# Patient Record
Sex: MALE | Race: BLACK OR AFRICAN AMERICAN | NOT HISPANIC OR LATINO | Employment: FULL TIME | URBAN - METROPOLITAN AREA
[De-identification: names, ages, dates, MRNs, and addresses within clinical notes are randomized per-mention and may not be internally consistent; named-entity substitution may affect disease eponyms.]

---

## 2017-01-19 ENCOUNTER — OFFICE VISIT (OUTPATIENT)
Dept: CARDIOLOGY | Facility: CLINIC | Age: 22
End: 2017-01-19
Attending: INTERNAL MEDICINE
Payer: COMMERCIAL

## 2017-01-19 VITALS
DIASTOLIC BLOOD PRESSURE: 104 MMHG | WEIGHT: 315 LBS | BODY MASS INDEX: 44.1 KG/M2 | HEIGHT: 71 IN | HEART RATE: 88 BPM | SYSTOLIC BLOOD PRESSURE: 180 MMHG

## 2017-01-19 DIAGNOSIS — I42.9 SECONDARY CARDIOMYOPATHY (H): ICD-10-CM

## 2017-01-19 DIAGNOSIS — I10 ESSENTIAL HYPERTENSION WITH GOAL BLOOD PRESSURE LESS THAN 140/90: ICD-10-CM

## 2017-01-19 DIAGNOSIS — I10 BENIGN ESSENTIAL HYPERTENSION: Primary | ICD-10-CM

## 2017-01-19 DIAGNOSIS — I10 BENIGN ESSENTIAL HYPERTENSION: ICD-10-CM

## 2017-01-19 LAB
ANION GAP SERPL CALCULATED.3IONS-SCNC: 15.1 MMOL/L (ref 6–17)
BUN SERPL-MCNC: 12 MG/DL (ref 7–30)
CALCIUM SERPL-MCNC: 9.4 MG/DL (ref 8.5–10.5)
CHLORIDE SERPL-SCNC: 102 MMOL/L (ref 98–107)
CO2 SERPL-SCNC: 26 MMOL/L (ref 23–29)
CREAT SERPL-MCNC: 0.88 MG/DL (ref 0.7–1.3)
GFR SERPL CREATININE-BSD FRML MDRD: >90 ML/MIN/1.7M2
GLUCOSE SERPL-MCNC: 123 MG/DL (ref 70–105)
POTASSIUM SERPL-SCNC: 3.1 MMOL/L (ref 3.5–5.1)
SODIUM SERPL-SCNC: 140 MMOL/L (ref 136–145)

## 2017-01-19 PROCEDURE — 36415 COLL VENOUS BLD VENIPUNCTURE: CPT | Performed by: INTERNAL MEDICINE

## 2017-01-19 PROCEDURE — 80048 BASIC METABOLIC PNL TOTAL CA: CPT | Performed by: INTERNAL MEDICINE

## 2017-01-19 PROCEDURE — 99214 OFFICE O/P EST MOD 30 MIN: CPT | Performed by: INTERNAL MEDICINE

## 2017-01-19 RX ORDER — SPIRONOLACTONE 50 MG/1
50 TABLET, FILM COATED ORAL DAILY
Qty: 90 TABLET | Refills: 3 | Status: SHIPPED | OUTPATIENT
Start: 2017-01-19

## 2017-01-19 NOTE — Clinical Note
1/19/2017    Kendra Chiang, DO  [facility]  [address]  [place] [address]    RE: Kirk Chiu       Dear Colleague,    I had the pleasure of seeing Kirk in Cardiology Clinic today.  He is a 21-year-old male with previous history of myocarditis, nonischemic cardiomyopathy related to uncontrolled hypertension who returns for followup.  He also has sleep apnea and morbid obesity.  He takes CPAP religiously.  Unfortunately, his blood pressure remains uncontrolled because of noncompliance.  He forgets medications more often than not.  He usually takes all his medications about 3 days a week.  His concern is that he is always moving around, and he forgets taking it in the morning and then the pills are not with him.  We talked at length about importance of compliance with medications.  I discussed with him various ways he could be improving that.  He could keep a setup of pills at work or in his car.  We also talked about moving his pills into his bathroom so he can remember taking them.  I also reviewed with him some apps on his iPhone that he can use to remind him to take the pills.  There are some pill tracker apps that he could use.  He promises me that he will be much more compliant and follow that.  His BMP today shows a low potassium of 3.1, but he has not taken his spironolactone adequately.  In any case, I believe that he would benefit from a higher dose of spironolactone, and therefore I will increase it to 50 mg daily.  I also asked him to take 2 bananas today and tomorrow and then will check a BMP in 1-2 weeks.  He is also on lisinopril 40 per day, amlodipine 10 per day and Coreg 25 b.i.d., which will be continued.  He does want to join a fitness club, but I have asked him to wait until his blood pressure is well controlled.  Losing weight will help a lot, and he is dedicated to do that this year.  His last echocardiogram in July showed an EF of 40%-45%, which I  believe in part is related to his uncontrolled hypertension.  He had a history of myocarditis in 2014.        PHYSICAL EXAMINATION:    VITAL SIGNS:  Blood pressure initially was 199/130, on repeat check was 188/104 after he sat down for 15 minutes and then we used a larger cuff.  He has not taken his pills this morning and is going to take it immediately as he gets home from this visit.  Pulse is 88 per minute and regular.   CARDIAC:  Regular S1, S2 with an S4, no murmurs were heard.   CHEST:  Clear to auscultation.      Outpatient Encounter Prescriptions as of 1/19/2017   Medication Sig Dispense Refill     spironolactone (ALDACTONE) 50 MG tablet Take 1 tablet (50 mg) by mouth daily 90 tablet 3     albuterol (PROAIR HFA, PROVENTIL HFA, VENTOLIN HFA) 108 (90 BASE) MCG/ACT inhaler Inhale 2 puffs into the lungs every 6 hours       carvedilol (COREG) 25 MG tablet Take 1 tablet (25 mg) by mouth 2 times daily (with meals) 180 tablet 3     amLODIPine (NORVASC) 10 MG tablet Take 1 tablet (10 mg) by mouth daily 90 tablet 3     lisinopril (PRINIVIL,ZESTRIL) 40 MG tablet Take 1 tablet (40 mg) by mouth daily Needs office visit please 90 tablet 3     ORDER FOR DME Wears CPAP every night       Multiple Vitamins-Minerals (MULTIVITAMIN & MINERAL PO) Take 1 tablet by mouth daily       Cholecalciferol (VITAMIN D) 1000 UNITS capsule Take 1 capsule by mouth daily       [DISCONTINUED] spironolactone (ALDACTONE) 25 MG tablet Take 1 tablet (25 mg) by mouth daily 90 tablet 3     No facility-administered encounter medications on file as of 1/19/2017.     IMPRESSION:   1.  Hypertension.  Key here is to modify his lifestyle and be more compliant with medications.  Several studies were discussed as noted above.  I have also increased spironolactone to 50 per day and will repeat a BMP in 2 weeks and have him see my nurse practitioner in 2 weeks.  I told him that he needs several visits with my nurse practitioner to get his blood pressure under  better control.  He means well but has difficulty remembering to take pills.  I am hoping he will improve that.   2.  History of myocarditis.  His last EF was 40%-45%.  I am hoping with improvement in ejection fraction, his EF will improve further.      PLAN:   1.  BMP in 2 weeks and followup with Pao.   2.  Increase spironolactone to 50 per day.   3.  Eat bananas today and tomorrow for replacing the potassium.   4.  I would recommend a followup echocardiogram with me in 6 months and visit at that time.     Again, thank you for allowing me to participate in the care of your patient.      Sincerely,    Luis Whiteside MD     [facility]

## 2017-01-19 NOTE — PATIENT INSTRUCTIONS
Two bananas today  Take 50mg spironolactone from now on  Keep pills in accessible place   Get back you in 2 weeks

## 2017-01-19 NOTE — PROGRESS NOTES
HPI and Plan:   See dictation  [id number]    Orders Placed This Encounter   Procedures     Basic metabolic panel     Follow-Up with Cardiac Advanced Practice Provider       Orders Placed This Encounter   Medications     spironolactone (ALDACTONE) 50 MG tablet     Sig: Take 1 tablet (50 mg) by mouth daily     Dispense:  90 tablet     Refill:  3       Medications Discontinued During This Encounter   Medication Reason     spironolactone (ALDACTONE) 25 MG tablet Reorder         Encounter Diagnoses   Name Primary?     Secondary cardiomyopathy (H)      Benign essential hypertension Yes     Essential hypertension with goal blood pressure less than 140/90        CURRENT MEDICATIONS:  Current Outpatient Prescriptions   Medication Sig Dispense Refill     spironolactone (ALDACTONE) 50 MG tablet Take 1 tablet (50 mg) by mouth daily 90 tablet 3     albuterol (PROAIR HFA, PROVENTIL HFA, VENTOLIN HFA) 108 (90 BASE) MCG/ACT inhaler Inhale 2 puffs into the lungs every 6 hours       carvedilol (COREG) 25 MG tablet Take 1 tablet (25 mg) by mouth 2 times daily (with meals) 180 tablet 3     amLODIPine (NORVASC) 10 MG tablet Take 1 tablet (10 mg) by mouth daily 90 tablet 3     lisinopril (PRINIVIL,ZESTRIL) 40 MG tablet Take 1 tablet (40 mg) by mouth daily Needs office visit please 90 tablet 3     ORDER FOR DME Wears CPAP every night       Multiple Vitamins-Minerals (MULTIVITAMIN & MINERAL PO) Take 1 tablet by mouth daily       Cholecalciferol (VITAMIN D) 1000 UNITS capsule Take 1 capsule by mouth daily       [DISCONTINUED] spironolactone (ALDACTONE) 25 MG tablet Take 1 tablet (25 mg) by mouth daily 90 tablet 3       ALLERGIES     Allergies   Allergen Reactions     Nkda [No Known Drug Allergies]        PAST MEDICAL HISTORY:  Past Medical History   Diagnosis Date     Morbid obesity (H)      Asthma with acute exacerbation 10/12/14     asthma 2006 diagnosis     HTN, goal below 140/90      Pulmonary nodule      Cardiomyopathy (H) Oct 2014      "possible viral myocarditis, 10/2014 EF 35% per Echo, 10/2014 EF 43% per Cardiac MRI     ADELA (obstructive sleep apnea) OCT 2014       PAST SURGICAL HISTORY:  History reviewed. No pertinent past surgical history.    FAMILY HISTORY:  Family History   Problem Relation Age of Onset     Allergies Maternal Grandmother      Breast Cancer Maternal Grandmother      CANCER Maternal Grandmother      4 sibs with various cancers     Allergies Mother      HEART DISEASE Mother      cardiomyopathy     Unknown/Adopted Father      Family History Negative Maternal Grandfather      Unknown/Adopted Maternal Grandfather      Unknown/Adopted Paternal Grandmother      Unknown/Adopted Paternal Grandfather        SOCIAL HISTORY:  Social History     Social History     Marital Status: Single     Spouse Name: N/A     Number of Children: N/A     Years of Education: N/A     Social History Main Topics     Smoking status: Never Smoker      Smokeless tobacco: Never Used     Alcohol Use: No     Drug Use: No     Sexual Activity: No     Other Topics Concern     Parent/Sibling W/ Cabg, Mi Or Angioplasty Before 65f 55m? No     Caffeine Concern No     none     Sleep Concern Yes     sleep apnea, wears c-pap     Stress Concern No     Weight Concern Yes     weight decrease 4 lbs     Special Diet No     Hasn't been following a diet     Back Care No     Exercise No     Basketball x2 a week     Seat Belt Yes     Social History Narrative       Review of Systems:  Skin:  Negative       Eyes:  Positive for glasses    ENT:  Negative      Respiratory:  Positive for sleep apnea;CPAP asthma   Cardiovascular:  Negative      Gastroenterology: Negative      Genitourinary:  Negative      Musculoskeletal:  Negative      Neurologic:  Negative      Psychiatric:  Negative      Heme/Lymph/Imm:  Negative      Endocrine:  Negative        Physical Exam:  Vitals: /104 mmHg  Pulse 88  Ht 1.803 m (5' 11\")  Wt 191.418 kg (422 lb)  BMI 58.88 kg/m2    Constitutional:  alert " and oriented morbidly obese      Skin:  warm and dry to the touch        Head:  normocephalic        Eyes:  pupils equal and round        ENT:  no pallor or cyanosis        Neck:  carotid pulses are full and equal bilaterally        Chest:    prolonged expiration        Cardiac: regular rhythm;normal S1 and S2   S4;distant heart sounds              Abdomen:  abdomen soft obese      Vascular: not assessed this visit                                        Extremities and Back:  no edema              Neurological:  no gross motor deficits              CC  Kendra Chiang, [facility]  [address] [facility] [address]  [place] [address]

## 2017-01-19 NOTE — MR AVS SNAPSHOT
After Visit Summary   1/19/2017    Kirk Chiu    MRN: [id number]           Patient Information     Date Of Birth          1995        Visit Information        Provider Department      1/19/2017 10:45 AM Luis Whiteside MD [facility] [place]        Today's Diagnoses     Benign essential hypertension    -  1     Secondary cardiomyopathy (H)         Essential hypertension with goal blood pressure less than 140/90           Care Instructions    Two bananas today  Take 50mg spironolactone from now on  Keep pills in accessible place   Get back you in 2 weeks            Follow-ups after your visit        Additional Services     Follow-Up with Cardiac Advanced Practice Provider                 Future tests that were ordered for you today     Open Future Orders        Priority Expected Expires Ordered    Basic metabolic panel Routine 2/2/2017 1/19/2018 1/19/2017    Follow-Up with Cardiac Advanced Practice Provider Routine 2/2/2017 1/19/2018 1/19/2017            Who to contact     If you have questions or need follow up information about today's clinic visit or your schedule please contact [facility] directly at [phone].  Normal or non-critical lab and imaging results will be communicated to you by [employer], letter or phone within 4 business days after the clinic has received the results. If you do not hear from us within 7 days, please contact the clinic through [employer] or phone. If you have a critical or abnormal lab result, we will notify you by phone as soon as possible.  Submit refill requests through [employer] or call your pharmacy and they will forward the refill request to us. Please allow 3 business days for your refill to be completed.          Additional Information About Your Visit        MyChart Information     [employer] gives you secure access to your electronic health record. If you see a primary  "care provider, you can also send messages to your care team and make appointments. If you have questions, please call your primary care clinic.  If you do not have a primary care provider, please call [phone] and they will assist you.        Care EveryWhere ID     This is your Care EveryWhere ID. This could be used by other organizations to access your [facility] medical records  [id number]        Your Vitals Were     Pulse Height BMI (Body Mass Index)             88 1.803 m (5' 11\") 58.88 kg/m2          Blood Pressure from Last 3 Encounters:   01/19/17 180/104   10/24/16 158/102   09/20/16 180/116    Weight from Last 3 Encounters:   01/19/17 191.418 kg (422 lb)   10/24/16 189.604 kg (418 lb)   09/20/16 186.247 kg (410 lb 9.6 oz)              We Performed the Following     Follow-Up with Cardiologist          Today's Medication Changes          These changes are accurate as of: 1/19/17 10:49 AM.  If you have any questions, ask your nurse or doctor.               These medicines have changed or have updated prescriptions.        Dose/Directions    spironolactone 50 MG tablet   Commonly known as:  ALDACTONE   This may have changed:    - medication strength  - how much to take   Used for:  Essential hypertension with goal blood pressure less than 140/90   Changed by:  Luis Whiteside MD        Dose:  50 mg   Take 1 tablet (50 mg) by mouth daily   Quantity:  90 tablet   Refills:  3            Where to get your medicines      These medications were sent to [id number] - [facility] [address] AT [facility] [address], [facility] [address]    Hours:  24-hours Phone:  [phone]    - spironolactone 50 MG tablet             Primary Care Provider Office Phone # Fax #    Kendra Chiang [phone]       [facility] [place] [address]  [place] [address]        Goals        Patient-Stated    \"I will take my blood pressure once a " "day and bring in the readings to my doctors.\"      Goal Comments - Note edited  11/11/2014 11:11 AM by Anny Burton RN    As of today's date 10/28/2014 goal is met at 0 - 25%.   Goal Status:  Ongoing  As of today's date 11/11/2014 goal is met at 76 - 100%.   Goal Status:  Ongoing        Thank you!     Thank you for choosing [facility] [place]  for your care. Our goal is always to provide you with excellent care. Hearing back from our patients is one way we can continue to improve our services. Please take a few minutes to complete the written survey that you may receive in the mail after your visit with us. Thank you!             Your Updated Medication List - Protect others around you: Learn how to safely use, store and throw away your medicines at www.disposemymeds.org.          This list is accurate as of: 1/19/17 10:49 AM.  Always use your most recent med list.                   Brand Name Dispense Instructions for use    albuterol 108 (90 BASE) MCG/ACT Inhaler    PROAIR HFA/PROVENTIL HFA/VENTOLIN HFA     Inhale 2 puffs into the lungs every 6 hours       amLODIPine 10 MG tablet    NORVASC    90 tablet    Take 1 tablet (10 mg) by mouth daily       carvedilol 25 MG tablet    COREG    180 tablet    Take 1 tablet (25 mg) by mouth 2 times daily (with meals)       lisinopril 40 MG tablet    PRINIVIL/ZESTRIL    90 tablet    Take 1 tablet (40 mg) by mouth daily Needs office visit please       MULTIVITAMIN & MINERAL PO      Take 1 tablet by mouth daily       order for DME      Wears CPAP every night       spironolactone 50 MG tablet    ALDACTONE    90 tablet    Take 1 tablet (50 mg) by mouth daily       vitamin D 1000 UNITS capsule      Take 1 capsule by mouth daily         "

## 2017-01-20 NOTE — PROGRESS NOTES
HISTORY OF PRESENT ILLNESS:  I had the pleasure of seeing Kirk in Cardiology Clinic today.  He is a 21-year-old male with previous history of myocarditis, nonischemic cardiomyopathy related to uncontrolled hypertension who returns for followup.  He also has sleep apnea and morbid obesity.  He takes CPAP religiously.  Unfortunately, his blood pressure remains uncontrolled because of noncompliance.  He forgets medications more often than not.  He usually takes all his medications about 3 days a week.  His concern is that he is always moving around, and he forgets taking it in the morning and then the pills are not with him.  We talked at length about importance of compliance with medications.  I discussed with him various ways he could be improving that.  He could keep a setup of pills at work or in his car.  We also talked about moving his pills into his bathroom so he can remember taking them.  I also reviewed with him some apps on his iPhone that he can use to remind him to take the pills.  There are some pill tracker apps that he could use.  He promises me that he will be much more compliant and follow that.  His BMP today shows a low potassium of 3.1, but he has not taken his spironolactone adequately.  In any case, I believe that he would benefit from a higher dose of spironolactone, and therefore I will increase it to 50 mg daily.  I also asked him to take 2 bananas today and tomorrow and then will check a BMP in 1-2 weeks.  He is also on lisinopril 40 per day, amlodipine 10 per day and Coreg 25 b.i.d., which will be continued.  He does want to join a fitness club, but I have asked him to wait until his blood pressure is well controlled.  Losing weight will help a lot, and he is dedicated to do that this year.  His last echocardiogram in July showed an EF of 40%-45%, which I believe in part is related to his uncontrolled hypertension.  He had a history of myocarditis in 2014.        PHYSICAL EXAMINATION:     VITAL SIGNS:  Blood pressure initially was 199/130, on repeat check was 188/104 after he sat down for 15 minutes and then we used a larger cuff.  He has not taken his pills this morning and is going to take it immediately as he gets home from this visit.  Pulse is 88 per minute and regular.   CARDIAC:  Regular S1, S2 with an S4, no murmurs were heard.   CHEST:  Clear to auscultation.      IMPRESSION:   1.  Hypertension.  Key here is to modify his lifestyle and be more compliant with medications.  Several studies were discussed as noted above.  I have also increased spironolactone to 50 per day and will repeat a BMP in 2 weeks and have him see my nurse practitioner in 2 weeks.  I told him that he needs several visits with my nurse practitioner to get his blood pressure under better control.  He means well but has difficulty remembering to take pills.  I am hoping he will improve that.   2.  History of myocarditis.  His last EF was 40%-45%.  I am hoping with improvement in ejection fraction, his EF will improve further.      PLAN:   1.  BMP in 2 weeks and followup with Pao.   2.  Increase spironolactone to 50 per day.   3.  Eat bananas today and tomorrow for replacing the potassium.   4.  I would recommend a followup echocardiogram with me in 6 months and visit at that time.         JOSH TANNER MD             D: 2017 10:54   T: 2017 19:39   MT: HORACE      Name:     MICHELLE PATEKO   MRN:      [id number]        Account:      [id number]   :      1995           Service Date: 2017      Document: [id number]

## 2017-02-07 ENCOUNTER — TELEPHONE (OUTPATIENT)
Dept: CARDIOLOGY | Facility: CLINIC | Age: 22
End: 2017-02-07

## 2017-02-07 NOTE — TELEPHONE ENCOUNTER
Chart prep for OV 2/14. BMP ordered and needed prior to that appt. LVM for pt asking if he could come in the day before, asked for call back to review.

## 2017-02-13 NOTE — TELEPHONE ENCOUNTER
LVM pt requesting that he have BMP done today here or [facility] in prep for OV early tomorrow. May need to get labs post OV visit if he's unable to prior.

## 2017-09-25 DIAGNOSIS — I10 ESSENTIAL HYPERTENSION WITH GOAL BLOOD PRESSURE LESS THAN 140/90: ICD-10-CM

## 2017-09-25 RX ORDER — AMLODIPINE BESYLATE 10 MG/1
10 TABLET ORAL DAILY
Qty: 90 TABLET | Refills: 0 | Status: SHIPPED | OUTPATIENT
Start: 2017-09-25 | End: 2021-09-30

## 2017-09-28 ENCOUNTER — OFFICE VISIT (OUTPATIENT)
Dept: URGENT CARE | Facility: URGENT CARE | Age: 22
End: 2017-09-28
Payer: COMMERCIAL

## 2017-09-28 VITALS
DIASTOLIC BLOOD PRESSURE: 94 MMHG | BODY MASS INDEX: 57.08 KG/M2 | OXYGEN SATURATION: 98 % | TEMPERATURE: 98.6 F | SYSTOLIC BLOOD PRESSURE: 174 MMHG | WEIGHT: 315 LBS | HEART RATE: 76 BPM

## 2017-09-28 DIAGNOSIS — L03.90 CELLULITIS, UNSPECIFIED CELLULITIS SITE: ICD-10-CM

## 2017-09-28 DIAGNOSIS — L60.0 INGROWING RIGHT GREAT TOENAIL: Primary | ICD-10-CM

## 2017-09-28 PROCEDURE — 99213 OFFICE O/P EST LOW 20 MIN: CPT | Mod: 25 | Performed by: PHYSICIAN ASSISTANT

## 2017-09-28 PROCEDURE — 11730 AVULSION NAIL PLATE SIMPLE 1: CPT | Performed by: PHYSICIAN ASSISTANT

## 2017-09-28 RX ORDER — CEPHALEXIN 500 MG/1
500 CAPSULE ORAL 4 TIMES DAILY
Qty: 40 CAPSULE | Refills: 0 | Status: SHIPPED | OUTPATIENT
Start: 2017-09-28 | End: 2018-01-15

## 2017-09-28 RX ORDER — CETIRIZINE HYDROCHLORIDE 10 MG/1
10 TABLET ORAL DAILY
COMMUNITY
End: 2021-09-30

## 2017-09-28 NOTE — NURSING NOTE
"Chief Complaint   Patient presents with     Ingrown Toenail     ingrown toenail of right big toe for a few weeks.        Initial BP (!) 174/94  Pulse 76  Temp 98.6  F (37  C) (Oral)  Wt (!) 409 lb 4 oz (185.6 kg)  SpO2 98%  BMI 57.08 kg/m2 Estimated body mass index is 57.08 kg/(m^2) as calculated from the following:    Height as of 1/19/17: 5' 11\" (1.803 m).    Weight as of this encounter: 409 lb 4 oz (185.6 kg).  Medication Reconciliation: complete    "

## 2017-09-28 NOTE — MR AVS SNAPSHOT
After Visit Summary   9/28/2017    Kirk Chiu    MRN: [id number]           Patient Information     Date Of Birth          1995        Visit Information        Provider Department      9/28/2017 2:00 PM Surjit Quezada PA-C [facility]        Today's Diagnoses     Ingrowing right great toenail    -  1    Cellulitis, unspecified cellulitis site           Follow-ups after your visit        Who to contact     If you have questions or need follow up information about today's clinic visit or your schedule please contact [facility] directly at [phone].  Normal or non-critical lab and imaging results will be communicated to you by MyChart, letter or phone within 4 business days after the clinic has received the results. If you do not hear from us within 7 days, please contact the clinic through [employer] or phone. If you have a critical or abnormal lab result, we will notify you by phone as soon as possible.  Submit refill requests through [employer] or call your pharmacy and they will forward the refill request to us. Please allow 3 business days for your refill to be completed.          Additional Information About Your Visit        MyChart Information     [employer] gives you secure access to your electronic health record. If you see a primary care provider, you can also send messages to your care team and make appointments. If you have questions, please call your primary care clinic.  If you do not have a primary care provider, please call [phone] and they will assist you.        Care EveryWhere ID     This is your Care EveryWhere ID. This could be used by other organizations to access your [place] medical records  [id number]        Your Vitals Were     Pulse Temperature Pulse Oximetry BMI (Body Mass Index)          76 98.6  F (37  C) (Oral) 98% 57.08 kg/m2         Blood Pressure from Last 3 Encounters:   09/28/17 (!) 174/94  "  01/19/17 (!) 180/104   10/24/16 (!) 158/102    Weight from Last 3 Encounters:   09/28/17 (!) 409 lb 4 oz (185.6 kg)   01/19/17 (!) 422 lb (191.4 kg)   10/24/16 (!) 418 lb (189.6 kg)              We Performed the Following     REMOVAL OF NAIL PLATE SIMPLE SINGLE          Today's Medication Changes          These changes are accurate as of: 9/28/17 11:59 PM.  If you have any questions, ask your nurse or doctor.               Start taking these medicines.        Dose/Directions    cephALEXin 500 MG capsule   Commonly known as:  KEFLEX   Used for:  Ingrowing right great toenail, Cellulitis, unspecified cellulitis site   Started by:  Sujrit Quezada PA-C        Dose:  500 mg   Take 1 capsule (500 mg) by mouth 4 times daily   Quantity:  40 capsule   Refills:  0            Where to get your medicines      These medications were sent to [facility] [address] [place], [place] [address] [facility]  [address], [facility] [address]    Hours:  24-hours Phone:  [phone]     cephALEXin 500 MG capsule                Primary Care Provider Office Phone # Fax #    Kendra Kraft ChiangDO [phone] [address] [facility] [address]  [place] [address]        Goals        General    \"I will take my blood pressure once a day and bring in the readings to my doctors.\"  (pt-stated)     Notes - Note edited  11/11/2014 11:11 AM by Anny Burton, RN    As of today's date 10/28/2014 goal is met at 0 - 25%.   Goal Status:  Ongoing  As of today's date 11/11/2014 goal is met at 76 - 100%.   Goal Status:  Ongoing        Equal Access to Services     [facility] : Davidson Cash, wastacieda luqadaha, qaybta kaalmada shreyas, john borrero. [facility] [phone].    ATENCIÓN: Si abdullahila español, tiene a addison disposición servicios gratuitos de asistencia lingüística. Llame al [phone].    We comply with applicable federal civil rights laws and " [place] laws. We do not discriminate on the basis of race, color, national origin, age, disability sex, sexual orientation or gender identity.            Thank you!     Thank you for choosing [facility]  for your care. Our goal is always to provide you with excellent care. Hearing back from our patients is one way we can continue to improve our services. Please take a few minutes to complete the written survey that you may receive in the mail after your visit with us. Thank you!             Your Updated Medication List - Protect others around you: Learn how to safely use, store and throw away your medicines at www.disposemymeds.org.          This list is accurate as of: 9/28/17 11:59 PM.  Always use your most recent med list.                   Brand Name Dispense Instructions for use Diagnosis    albuterol 108 (90 BASE) MCG/ACT Inhaler    PROAIR HFA/PROVENTIL HFA/VENTOLIN HFA     Inhale 2 puffs into the lungs every 6 hours        amLODIPine 10 MG tablet    NORVASC    90 tablet    Take 1 tablet (10 mg) by mouth daily    Essential hypertension with goal blood pressure less than 140/90       carvedilol 25 MG tablet    COREG    180 tablet    Take 1 tablet (25 mg) by mouth 2 times daily (with meals)    Benign essential hypertension, Essential hypertension with goal blood pressure less than 140/90       cephALEXin 500 MG capsule    KEFLEX    40 capsule    Take 1 capsule (500 mg) by mouth 4 times daily    Ingrowing right great toenail, Cellulitis, unspecified cellulitis site       cetirizine 10 MG tablet    zyrTEC     Take 10 mg by mouth daily        lisinopril 40 MG tablet    PRINIVIL/ZESTRIL    90 tablet    Take 1 tablet (40 mg) by mouth daily Needs office visit please    Essential hypertension with goal blood pressure less than 140/90       MULTIVITAMIN & MINERAL PO      Take 1 tablet by mouth daily        order for DME      Wears CPAP every night        spironolactone 50 MG tablet     ALDACTONE    90 tablet    Take 1 tablet (50 mg) by mouth daily    Essential hypertension with goal blood pressure less than 140/90       vitamin D 1000 UNITS capsule      Take 1 capsule by mouth daily

## 2017-09-29 NOTE — PROGRESS NOTES
SUBJECTIVE:  Chief Complaint   Patient presents with     Ingrown Toenail     ingrown toenail of right big toe for a few weeks.      Kirk SCHWARZ Lilliam Chiu is a 22 year old male presents with a chief complaint of right toe(s) great pain, swelling and tenderness.  How: no known injury.  The patient complained of moderate pain  and has had decreased ROM.  Pain exacerbated by weight-bearing.  Relieved by rest.  He treated it initially with no therapy. This is the first time this type of injury has occurred to this patient.     Past Medical History:   Diagnosis Date     Asthma with acute exacerbation 10/12/14    asthma 2006 diagnosis     Cardiomyopathy (H) Oct 2014    possible viral myocarditis, 10/2014 EF 35% per Echo, 10/2014 EF 43% per Cardiac MRI     HTN, goal below 140/90      Morbid obesity (H)      ADELA (obstructive sleep apnea) OCT 2014     Pulmonary nodule      Allergies   Allergen Reactions     Nkda [No Known Drug Allergies]      Social History   Substance Use Topics     Smoking status: Never Smoker     Smokeless tobacco: Never Used     Alcohol use No       ROS:  CONSTITUTIONAL:NEGATIVE for fever, chills, change in weight  INTEGUMENTARY/SKIN: POSITIVE for right great toe injury, swelling, erythema  MUSCULOSKELETAL: Positive for right great toe pain, tenderness  NEURO: NEGATIVE for weakness, dizziness or paresthesias    EXAM:   BP (!) 174/94  Pulse 76  Temp 98.6  F (37  C) (Oral)  Wt (!) 409 lb 4 oz (185.6 kg)  SpO2 98%  BMI 57.08 kg/m2  Gen: healthy,alert,no distress  Extremity: toe(s) great has erythema, swelling and point tenderness .   There is not compromise to the distal circulation.  Pulses are +2 and CRT is brisk  EXTREMITIES: peripheral pulses normal  MS:  Positive for right great to tenderness, erythema, swelling  SKIN: Positive for erythema, swelling, drainage  NEURO: Normal strength and tone, sensory exam grossly normal, mentation intact and speech normal    X-RAY was not  done    ASSESSMENT/PLAN:    ICD-10-CM    1. Ingrowing right great toenail L60.0 cephALEXin (KEFLEX) 500 MG capsule     REMOVAL OF NAIL PLATE SIMPLE SINGLE   2. Cellulitis, unspecified cellulitis site L03.90 cephALEXin (KEFLEX) 500 MG capsule       Warm foot soaks  Bandages      PROCEDURE:  Skin prep with betadine  Toe was infiltrated with 3cc 1% lidocaine   Wedge resection used  Patient tolerated procedure well

## 2017-10-25 DIAGNOSIS — I10 BENIGN ESSENTIAL HYPERTENSION: ICD-10-CM

## 2017-10-25 DIAGNOSIS — I10 ESSENTIAL HYPERTENSION WITH GOAL BLOOD PRESSURE LESS THAN 140/90: ICD-10-CM

## 2017-10-25 RX ORDER — CARVEDILOL 25 MG/1
25 TABLET ORAL 2 TIMES DAILY WITH MEALS
Qty: 180 TABLET | Refills: 0 | Status: SHIPPED | OUTPATIENT
Start: 2017-10-25

## 2017-12-27 DIAGNOSIS — I10 ESSENTIAL HYPERTENSION WITH GOAL BLOOD PRESSURE LESS THAN 140/90: ICD-10-CM

## 2017-12-27 RX ORDER — LISINOPRIL 40 MG/1
40 TABLET ORAL DAILY
Qty: 90 TABLET | Refills: 1 | Status: SHIPPED | OUTPATIENT
Start: 2017-12-27 | End: 2021-09-30

## 2018-01-15 ENCOUNTER — OFFICE VISIT (OUTPATIENT)
Dept: PODIATRY | Facility: CLINIC | Age: 23
End: 2018-01-15
Payer: COMMERCIAL

## 2018-01-15 VITALS — HEIGHT: 71 IN | BODY MASS INDEX: 44.1 KG/M2 | HEART RATE: 90 BPM | WEIGHT: 315 LBS

## 2018-01-15 DIAGNOSIS — M79.674 PAIN OF TOE OF RIGHT FOOT: ICD-10-CM

## 2018-01-15 DIAGNOSIS — L60.0 INGROWING NAIL: Primary | ICD-10-CM

## 2018-01-15 PROCEDURE — 11730 AVULSION NAIL PLATE SIMPLE 1: CPT | Mod: T5 | Performed by: PODIATRIST

## 2018-01-15 PROCEDURE — 99203 OFFICE O/P NEW LOW 30 MIN: CPT | Mod: 25 | Performed by: PODIATRIST

## 2018-01-15 ASSESSMENT — PAIN SCALES - GENERAL: PAINLEVEL: SEVERE PAIN (6)

## 2018-01-15 NOTE — MR AVS SNAPSHOT
After Visit Summary   1/15/2018    Kirk Chiu    MRN: [id number]           Patient Information     Date Of Birth          1995        Visit Information        Provider Department      1/15/2018 8:45 AM Ronny Ortiz DPM [facility]        Today's Diagnoses     Ingrowing nail, lateral edge, right hallux    -  1    Pain of toe of right foot          Care Instructions    Thank you for choosing [facility]!    DR. ORTIZ'S CLINIC LOCATIONS     MONDAY - [place] WEDNESDAY - [place]   [address] [place] [address] [place] [address]   [phone] / [phone] / [phone]       THURSDAY - [place] SCHEDULE SURGERY: [phone]   [address] APPOINTMENTS: [phone]   [place] [address] BILLING QUESTIONS: [phone] / [phone]       DR. ORTIZ'S CLINIC LOCATIONS     MONDAY - [place] WEDNESDAY - [place]   [address] [facility] [address] [place] [address] [place] [address]   [phone] / [phone] / [phone]       THURSDAY - [place] SCHEDULE SURGERY: [phone]   [address] APPOINTMENTS: [phone]   [place] [address] BILLING QUESTIONS: [phone] / [phone]       [facility]  1. Keep bandage on until that evening or the day after your procedure. If the bandage falls off, start the soaking process.    2. Some bleeding is normal. If bleeding seems excessive to you, place ice on top of your foot for 15-20 minutes and elevate your foot above heart level.    3. Over the counter pain medication, elevating your foot and ice application is all you will need for pain control.    4. If the bandage feels too tight and your toe is throbbing it is ok to remove the bandage and start soaking.     5. For two weeks, soak your foot twice a day in mild skin  friendly soap (dish or hand soap) and warm water for 15 minutes. It is ok to soak your foot for a few minutes to loosen the dressing applied in the clinic. After soaking, blot dry and apply a regular band aid.    6. It is normal to experience some discomfort and redness around the nail for several days following the procedure. Drainage will likely appear a red- yellow. This is normal. If your toe is still draining a red-yellow fluid after 2 weeks continue to soak foot.    7. Initial discomfort might last for 2-3 days. You may resume with regular activity as soon as you are comfortable, as long as you keep the wound clean and dry and follow the soaking instruction. It is recommended that you do not enter public swimming pools/hot tubs while your toe is draining.    8. If you are experiencing worsening pain and redness or notice pus after 2-3 days please contact the clinic. If it is after hours call the clinic number and follow the voice prompter. This will direct you to an on call doctor.      Body Mass Index (BMI)  Many things can cause foot and ankle problems. Foot structure, activity level, foot mechanics and injuries are common causes of pain.  One very important issue that often goes unmentioned, is body weight.  Extra weight can cause increased stress on muscles, ligaments, bones and tendons.  Sometimes just a few extra pounds is all it takes to put one over her/his threshold. Without reducing that stress, it can be difficult to alleviate pain. Some people are uncomfortable addressing this issue, but we feel it is important for you to think about it. As Foot &  Ankle specialists, our job is addressing the lower extremity problem and possible causes. Regarding extra body weight, we encourage patients to discuss diet and weight management plans with their primary care doctors. It is this team approach that gives you the best opportunity for pain relief and getting you back on your feet.       _______________________________________________________________________    Send a friend or family member to see Dr. Hernandez and recieve a [employer] discount. Just have them mention your name at their appointment and we will contact you with the discount information.   _______________________________________________________________________    BODY MASS INDEX (BMI)  Many things can cause foot and ankle problems. Foot structure, activity level, foot mechanics and injuries are common causes of pain.  One very important issue that often goes unmentioned, is body weight.  Extra weight can cause increased stress on muscles, ligaments, bones and tendons.  Sometimes just a few extra pounds is all it takes to put one over her/his threshold. Without reducing that stress, it can be difficult to alleviate pain. Some people are uncomfortable addressing this issue, but we feel it is important for you to think about it. As Foot &  Ankle specialists, our job is addressing the lower extremity problem and possible causes. Regarding extra body weight, we encourage patients to discuss diet and weight management plans with their primary care doctors. It is this team approach that gives you the best opportunity for pain relief and getting you back on your feet.                Follow-ups after your visit        Who to contact     If you have questions or need follow up information about today's clinic visit or your schedule please contact [facility] directly at [phone].  Normal or non-critical lab and imaging results will be communicated to you by MyChart, letter or phone within 4 business days after the clinic has received the results. If you do not hear from us within 7 days, please contact the clinic through MyChart or phone. If you have a critical or abnormal lab result, we will notify you by phone as soon as possible.  Submit refill requests through [employer] or call your pharmacy and they will  "forward the refill request to us. Please allow 3 business days for your refill to be completed.          Additional Information About Your Visit        [employer] Information     "[employer] gives you secure access to your electronic health record. If you see a primary care provider, you can also send messages to your care team and make appointments. If you have questions, please call your primary care clinic.  If you do not have a primary care provider, please call [phone] and they will assist you.        Care EveryWhere ID     This is your Care EveryWhere ID. This could be used by other organizations to access your [place] medical records  [id number]        Your Vitals Were     Pulse Height BMI (Body Mass Index)             90 5' 11\" (1.803 m) 55.79 kg/m2          Blood Pressure from Last 3 Encounters:   09/28/17 (!) 174/94   01/19/17 (!) 180/104   10/24/16 (!) 158/102    Weight from Last 3 Encounters:   01/15/18 (!) 400 lb (181.4 kg)   09/28/17 (!) 409 lb 4 oz (185.6 kg)   01/19/17 (!) 422 lb (191.4 kg)              We Performed the Following     REMOVAL OF NAIL PLATE SIMPLE SINGLE        Primary Care Provider Office Phone # Fax #    Kendra Chiang [phone] [address] [facility] [address]  [place] [address]        Goals        General    \"I will take my blood pressure once a day and bring in the readings to my doctors.\"  (pt-stated)     Notes - Note edited  11/11/2014 11:11 AM by Anny Burton, RN    As of today's date 10/28/2014 goal is met at 0 - 25%.   Goal Status:  Ongoing  As of today's date 11/11/2014 goal is met at 76 - 100%.   Goal Status:  Ongoing        Equal Access to Services     [facility] MEGHAN : Davidson Cash, wakevon tafoya, qaybta kaaljohn moody. [facility] [phone].    ATENCIÓN: Si habla español, tiene a addison disposición servicios gratuitos de asistencia lingüística. Katie al [phone].    We comply with " applicable federal civil rights laws and [place] laws. We do not discriminate on the basis of race, color, national origin, age, disability, sex, sexual orientation, or gender identity.            Thank you!     Thank you for choosing [facility]  for your care. Our goal is always to provide you with excellent care. Hearing back from our patients is one way we can continue to improve our services. Please take a few minutes to complete the written survey that you may receive in the mail after your visit with us. Thank you!             Your Updated Medication List - Protect others around you: Learn how to safely use, store and throw away your medicines at www.disposemymeds.org.          This list is accurate as of: 1/15/18  9:19 AM.  Always use your most recent med list.                   Brand Name Dispense Instructions for use Diagnosis    albuterol 108 (90 BASE) MCG/ACT Inhaler    PROAIR HFA/PROVENTIL HFA/VENTOLIN HFA     Inhale 2 puffs into the lungs every 6 hours        amLODIPine 10 MG tablet    NORVASC    90 tablet    Take 1 tablet (10 mg) by mouth daily    Essential hypertension with goal blood pressure less than 140/90       carvedilol 25 MG tablet    COREG    180 tablet    Take 1 tablet (25 mg) by mouth 2 times daily (with meals)    Benign essential hypertension, Essential hypertension with goal blood pressure less than 140/90       cetirizine 10 MG tablet    zyrTEC     Take 10 mg by mouth daily        lisinopril 40 MG tablet    PRINIVIL/ZESTRIL    90 tablet    Take 1 tablet (40 mg) by mouth daily Needs office visit please    Essential hypertension with goal blood pressure less than 140/90       MULTIVITAMIN & MINERAL PO      Take 1 tablet by mouth daily        spironolactone 50 MG tablet    ALDACTONE    90 tablet    Take 1 tablet (50 mg) by mouth daily    Essential hypertension with goal blood pressure less than 140/90       vitamin D 1000 UNITS capsule      Take 1 capsule by mouth  daily

## 2018-01-15 NOTE — NURSING NOTE
"Chief Complaint   Patient presents with     Establish Care     Toenail     right hallux       Initial Pulse 90  Ht 5' 11\" (1.803 m)  Wt (!) 400 lb (181.4 kg)  BMI 55.79 kg/m2 Estimated body mass index is 55.79 kg/(m^2) as calculated from the following:    Height as of this encounter: 5' 11\" (1.803 m).    Weight as of this encounter: 400 lb (181.4 kg).  Medication Reconciliation: complete    "

## 2018-01-15 NOTE — LETTER
"    1/15/2018         RE: Kirk Chiu  [address]    [facility] [address]        Dear Colleague,    Thank you for referring your patient, Kirk Chiu, to the [facility]. Please see a copy of my visit note below.      ASSESSMENT/PLAN:    Encounter Diagnoses   Name Primary?     Ingrowing nail, lateral edge, right hallux Yes     Pain of toe of right foot        Due to the degree of infection, partial nail avulsion was recommended.    Nail Avulsion Procedure  (non permanent removal)    The procedure was discussed with the patient, including risk of infection, abnormal nail regrowth, and possible need for a future nail procedure.  Post procedure home cares were explained. These cares are important for preventing infection and aiding in timely healing.   Verbal and written consent was obtained.   The site was marked and the \"Time Out\" called.     The base of the right hallux  was injected with 2 cc of  2% Lidocaine plain.  The toe was then prepped with betadine solution.  A tourniquet was applied around the base of the toe for hemostasis.   Next the toe was checked for adequate anesthesia.  With the Pt comfortable, the lateral nail was freed from the nail bed and marginal soft tissue attachments with a blunt instrument.  ( A nail splitter was then used to make a longitudinal cut 2mm from the lateral nail fold.  It was completed, atraumatically, under the eponychium with a [place] blade. ) Next, it was firmly grasped with a hemostat and removed in total.      Silvadene ointment was applied to the nail bed, followed by a compressive dressing.  The tourniquet was removed.  The distal toe turned immediately pink.  The foot was kept elevated for several minutes.  The patient tolerated the procedure well.      Patient is instructed to watch for, and call if,  increasing redness, drainage, and pain after 2-3 days.  Post procedure instructions provided - " handout given.    Body mass index is 55.79 kg/(m^2).    Weight management plan: Patient was referred to their PCP to discuss a diet and exercise plan.      Ronny Hernandez, NARENDRA, FACFAS, MS [facility] Department of Podiatry/Foot & Ankle Surgery      ____________________________________________________________________    HPI:         Chief Complaint: ingrown toenail, right big toe  Onset of problem: 4 months  Pain/ discomfort is described as:  Deep ache, throbbing  Ratin/10   Frequency:  intermittent    The pain is made worse with prolonged weight bearing and when the toe is bumped  Previous treatment: ice, soaking      *  Past Medical History:   Diagnosis Date     Asthma with acute exacerbation 10/12/14    asthma 2006 diagnosis     Cardiomyopathy (H) Oct 2014    possible viral myocarditis, 10/2014 EF 35% per Echo, 10/2014 EF 43% per Cardiac MRI     HTN, goal below 140/90      Morbid obesity (H)      ADELA (obstructive sleep apnea) OCT 2014     Pulmonary nodule    *  *No past surgical history on file.*  *  Current Outpatient Prescriptions   Medication Sig Dispense Refill     lisinopril (PRINIVIL/ZESTRIL) 40 MG tablet Take 1 tablet (40 mg) by mouth daily Needs office visit please 90 tablet 1     carvedilol (COREG) 25 MG tablet Take 1 tablet (25 mg) by mouth 2 times daily (with meals) 180 tablet 0     cetirizine (ZYRTEC) 10 MG tablet Take 10 mg by mouth daily       amLODIPine (NORVASC) 10 MG tablet Take 1 tablet (10 mg) by mouth daily 90 tablet 0     spironolactone (ALDACTONE) 50 MG tablet Take 1 tablet (50 mg) by mouth daily 90 tablet 3     albuterol (PROAIR HFA, PROVENTIL HFA, VENTOLIN HFA) 108 (90 BASE) MCG/ACT inhaler Inhale 2 puffs into the lungs every 6 hours       Multiple Vitamins-Minerals (MULTIVITAMIN & MINERAL PO) Take 1 tablet by mouth daily       Cholecalciferol (VITAMIN D) 1000 UNITS capsule Take 1 capsule by mouth daily         ROS:     A 10-point review of systems was performed and is positive for that  "noted in the HPI and as seen below.  All other areas are negative.     Numbness in feet?  no   Calf pain with walking? no  Recent foot/ankle injury? no  Weight change over past 12 months? no  Self perception as overweight? yes  Recent flu-like symptoms? no  Joint pain other than feet ? no    Social History: Employment:  [employer] ;  Exercise/Physical activity:  no;  Tobacco use:  no  Social History     Social History     Marital status: Single     Spouse name: N/A     Number of children: N/A     Years of education: N/A     Occupational History     Not on file.     Social History Main Topics     Smoking status: Never Smoker     Smokeless tobacco: Never Used     Alcohol use No     Drug use: No     Sexual activity: No     Other Topics Concern     Parent/Sibling W/ Cabg, Mi Or Angioplasty Before 65f 55m? No     Caffeine Concern No     none     Sleep Concern Yes     sleep apnea, wears c-pap     Stress Concern No     Weight Concern Yes     weight decrease 4 lbs     Special Diet No     Hasn't been following a diet     Back Care No     Exercise No     Basketball x2 a week     Seat Belt Yes     Social History Narrative       Family history:  Family History   Problem Relation Age of Onset     Allergies Maternal Grandmother      Breast Cancer Maternal Grandmother      CANCER Maternal Grandmother      4 sibs with various cancers     Allergies Mother      HEART DISEASE Mother      cardiomyopathy     Unknown/Adopted Father      Family History Negative Maternal Grandfather      Unknown/Adopted Maternal Grandfather      Unknown/Adopted Paternal Grandmother      Unknown/Adopted Paternal Grandfather        Rheumatoid arthritis:  no  Foot Problems: no  Diabetes: parent      EXAM:    Vitals: Pulse 90  Ht 5' 11\" (1.803 m)  Wt (!) 400 lb (181.4 kg)  BMI 55.79 kg/m2  BMI: Body mass index is 55.79 kg/(m^2).  Height: 5' 11\"    Constitutional/ general:  Pt is in no apparent distress, appears well-nourished.  Cooperative with history and " physical exam.     Vascular:  Pedal pulses are palpable bilaterally for both the DP and PT arteries.  CFT < 3 sec.  No edema.  Pedal hair growth noted.     Neuro:  Alert and oriented x 3. Coordinated gait.  Light touch sensation is intact to the L4, L5, S1 distributions. No obvious deficits.  No evidence of neurological-based weakness, spasticity, or contracture in the lower extremities.     Derm: erythema, edema, serous crust, granulation tissue, pain along the lateral nail until right hallux    Musculoskeletal:    Lower extremity muscle strength is normal.  Patient is ambulatory without an assistive device or brace .  No gross deformities.      Ronny Hernandez DPM, FACFAS, MS    [place] Department of Podiatry/Foot & Ankle Surgery                Again, thank you for allowing me to participate in the care of your patient.        Sincerely,        Ronny Hernandez DPM

## 2018-01-15 NOTE — PATIENT INSTRUCTIONS
Thank you for choosing [facility]!    DR. ORTIZ'S CLINIC LOCATIONS     MONDAY - [place] WEDNESDAY - [place]   [address] [place] [id number] [address] [place] [address]   [phone] / [phone] / [phone]       THURSDAY - [place] SCHEDULE SURGERY: [phone]   [address] APPOINTMENTS: [phone]   [place] [address] BILLING QUESTIONS: [phone] / [phone]       DR. [facility]S CLINIC LOCATIONS     MONDAY - [place] WEDNESDAY - [place]   [address] [place] [id number] [address] [phone]   [phone] / [phone] / [phone]       THURSDAY - [place] SCHEDULE SURGERY: [phone]   [address] APPOINTMENTS: [phone]   [place] [address] BILLING QUESTIONS: [phone] / [phone]       [facility]  1. Keep bandage on until that evening or the day after your procedure. If the bandage falls off, start the soaking process.    2. Some bleeding is normal. If bleeding seems excessive to you, place ice on top of your foot for 15-20 minutes and elevate your foot above heart level.    3. Over the counter pain medication, elevating your foot and ice application is all you will need for pain control.    4. If the bandage feels too tight and your toe is throbbing it is ok to remove the bandage and start soaking.     5. For two weeks, soak your foot twice a day in mild skin friendly soap (dish or hand soap) and warm water for 15 minutes. It is ok to soak your foot for a few minutes to loosen the dressing applied in the clinic. After soaking, blot dry and apply a regular band aid.    6. It is normal to experience some discomfort and redness around the nail for several days following the procedure. Drainage will likely appear a red- yellow. This is normal. If your toe is still draining a red-yellow  fluid after 2 weeks continue to soak foot.    7. Initial discomfort might last for 2-3 days. You may resume with regular activity as soon as you are comfortable, as long as you keep the wound clean and dry and follow the soaking instruction. It is recommended that you do not enter public swimming pools/hot tubs while your toe is draining.    8. If you are experiencing worsening pain and redness or notice pus after 2-3 days please contact the clinic. If it is after hours call the clinic number and follow the voice prompter. This will direct you to an on call doctor.      Body Mass Index (BMI)  Many things can cause foot and ankle problems. Foot structure, activity level, foot mechanics and injuries are common causes of pain.  One very important issue that often goes unmentioned, is body weight.  Extra weight can cause increased stress on muscles, ligaments, bones and tendons.  Sometimes just a few extra pounds is all it takes to put one over her/his threshold. Without reducing that stress, it can be difficult to alleviate pain. Some people are uncomfortable addressing this issue, but we feel it is important for you to think about it. As Foot &  Ankle specialists, our job is addressing the lower extremity problem and possible causes. Regarding extra body weight, we encourage patients to discuss diet and weight management plans with their primary care doctors. It is this team approach that gives you the best opportunity for pain relief and getting you back on your feet.      _______________________________________________________________________    Send a friend or family member to see Dr. Hernandez and recieve a [employer] discount. Just have them mention your name at their appointment and we will contact you with the discount information.   _______________________________________________________________________    BODY MASS INDEX (BMI)  Many things can cause foot and ankle problems. Foot structure, activity level, foot  mechanics and injuries are common causes of pain.  One very important issue that often goes unmentioned, is body weight.  Extra weight can cause increased stress on muscles, ligaments, bones and tendons.  Sometimes just a few extra pounds is all it takes to put one over her/his threshold. Without reducing that stress, it can be difficult to alleviate pain. Some people are uncomfortable addressing this issue, but we feel it is important for you to think about it. As Foot &  Ankle specialists, our job is addressing the lower extremity problem and possible causes. Regarding extra body weight, we encourage patients to discuss diet and weight management plans with their primary care doctors. It is this team approach that gives you the best opportunity for pain relief and getting you back on your feet.

## 2018-01-15 NOTE — PROGRESS NOTES
"  ASSESSMENT/PLAN:    Encounter Diagnoses   Name Primary?     Ingrowing nail, lateral edge, right hallux Yes     Pain of toe of right foot        Due to the degree of infection, partial nail avulsion was recommended.    Nail Avulsion Procedure  (non permanent removal)    The procedure was discussed with the patient, including risk of infection, abnormal nail regrowth, and possible need for a future nail procedure.  Post procedure home cares were explained. These cares are important for preventing infection and aiding in timely healing.   Verbal and written consent was obtained.   The site was marked and the \"Time Out\" called.     The base of the right hallux  was injected with 2 cc of  2% Lidocaine plain.  The toe was then prepped with betadine solution.  A tourniquet was applied around the base of the toe for hemostasis.   Next the toe was checked for adequate anesthesia.  With the Pt comfortable, the lateral nail was freed from the nail bed and marginal soft tissue attachments with a blunt instrument.  ( A nail splitter was then used to make a longitudinal cut 2mm from the lateral nail fold.  It was completed, atraumatically, under the eponychium with a [place] blade. ) Next, it was firmly grasped with a hemostat and removed in total.      Silvadene ointment was applied to the nail bed, followed by a compressive dressing.  The tourniquet was removed.  The distal toe turned immediately pink.  The foot was kept elevated for several minutes.  The patient tolerated the procedure well.      Patient is instructed to watch for, and call if,  increasing redness, drainage, and pain after 2-3 days.  Post procedure instructions provided - handout given.    Body mass index is 55.79 kg/(m^2).    Weight management plan: Patient was referred to their PCP to discuss a diet and exercise plan.      Ronny Hernandez DPM, FACFAS, MS    [place] Department of Podiatry/Foot & Ankle " Surgery      ____________________________________________________________________    HPI:         Chief Complaint: ingrown toenail, right big toe  Onset of problem: 4 months  Pain/ discomfort is described as:  Deep ache, throbbing  Ratin/10   Frequency:  intermittent    The pain is made worse with prolonged weight bearing and when the toe is bumped  Previous treatment: ice, soaking      *  Past Medical History:   Diagnosis Date     Asthma with acute exacerbation 10/12/14    asthma 2006 diagnosis     Cardiomyopathy (H) Oct 2014    possible viral myocarditis, 10/2014 EF 35% per Echo, 10/2014 EF 43% per Cardiac MRI     HTN, goal below 140/90      Morbid obesity (H)      ADELA (obstructive sleep apnea) OCT 2014     Pulmonary nodule    *  *No past surgical history on file.*  *  Current Outpatient Prescriptions   Medication Sig Dispense Refill     lisinopril (PRINIVIL/ZESTRIL) 40 MG tablet Take 1 tablet (40 mg) by mouth daily Needs office visit please 90 tablet 1     carvedilol (COREG) 25 MG tablet Take 1 tablet (25 mg) by mouth 2 times daily (with meals) 180 tablet 0     cetirizine (ZYRTEC) 10 MG tablet Take 10 mg by mouth daily       amLODIPine (NORVASC) 10 MG tablet Take 1 tablet (10 mg) by mouth daily 90 tablet 0     spironolactone (ALDACTONE) 50 MG tablet Take 1 tablet (50 mg) by mouth daily 90 tablet 3     albuterol (PROAIR HFA, PROVENTIL HFA, VENTOLIN HFA) 108 (90 BASE) MCG/ACT inhaler Inhale 2 puffs into the lungs every 6 hours       Multiple Vitamins-Minerals (MULTIVITAMIN & MINERAL PO) Take 1 tablet by mouth daily       Cholecalciferol (VITAMIN D) 1000 UNITS capsule Take 1 capsule by mouth daily         ROS:     A 10-point review of systems was performed and is positive for that noted in the HPI and as seen below.  All other areas are negative.     Numbness in feet?  no   Calf pain with walking? no  Recent foot/ankle injury? no  Weight change over past 12 months? no  Self perception as overweight? yes  Recent  "flu-like symptoms? no  Joint pain other than feet ? no    Social History: Employment:  [employer] ;  Exercise/Physical activity:  no;  Tobacco use:  no  Social History     Social History     Marital status: Single     Spouse name: N/A     Number of children: N/A     Years of education: N/A     Occupational History     Not on file.     Social History Main Topics     Smoking status: Never Smoker     Smokeless tobacco: Never Used     Alcohol use No     Drug use: No     Sexual activity: No     Other Topics Concern     Parent/Sibling W/ Cabg, Mi Or Angioplasty Before 65f 55m? No     Caffeine Concern No     none     Sleep Concern Yes     sleep apnea, wears c-pap     Stress Concern No     Weight Concern Yes     weight decrease 4 lbs     Special Diet No     Hasn't been following a diet     Back Care No     Exercise No     Basketball x2 a week     Seat Belt Yes     Social History Narrative       Family history:  Family History   Problem Relation Age of Onset     Allergies Maternal Grandmother      Breast Cancer Maternal Grandmother      CANCER Maternal Grandmother      4 sibs with various cancers     Allergies Mother      HEART DISEASE Mother      cardiomyopathy     Unknown/Adopted Father      Family History Negative Maternal Grandfather      Unknown/Adopted Maternal Grandfather      Unknown/Adopted Paternal Grandmother      Unknown/Adopted Paternal Grandfather        Rheumatoid arthritis:  no  Foot Problems: no  Diabetes: parent      EXAM:    Vitals: Pulse 90  Ht 5' 11\" (1.803 m)  Wt (!) 400 lb (181.4 kg)  BMI 55.79 kg/m2  BMI: Body mass index is 55.79 kg/(m^2).  Height: 5' 11\"    Constitutional/ general:  Pt is in no apparent distress, appears well-nourished.  Cooperative with history and physical exam.     Vascular:  Pedal pulses are palpable bilaterally for both the DP and PT arteries.  CFT < 3 sec.  No edema.  Pedal hair growth noted.     Neuro:  Alert and oriented x 3. Coordinated gait.  Light touch sensation is " intact to the L4, L5, S1 distributions. No obvious deficits.  No evidence of neurological-based weakness, spasticity, or contracture in the lower extremities.     Derm: erythema, edema, serous crust, granulation tissue, pain along the lateral nail until right hallux    Musculoskeletal:    Lower extremity muscle strength is normal.  Patient is ambulatory without an assistive device or brace .  No gross deformities.      Ronny Hernandez DPM, FACANGEL, MS    [facility] Department of Podiatry/Foot & Ankle Surgery

## 2018-02-20 ENCOUNTER — APPOINTMENT (OUTPATIENT)
Dept: SLEEP MEDICINE | Facility: CLINIC | Age: 23
End: 2018-02-20
Payer: COMMERCIAL

## 2018-02-21 ENCOUNTER — TELEPHONE (OUTPATIENT)
Dept: FAMILY MEDICINE | Facility: CLINIC | Age: 23
End: 2018-02-21

## 2018-02-21 ENCOUNTER — OFFICE VISIT (OUTPATIENT)
Dept: SLEEP MEDICINE | Facility: CLINIC | Age: 23
End: 2018-02-21
Payer: COMMERCIAL

## 2018-02-21 VITALS
DIASTOLIC BLOOD PRESSURE: 109 MMHG | OXYGEN SATURATION: 97 % | BODY MASS INDEX: 44.1 KG/M2 | RESPIRATION RATE: 18 BRPM | SYSTOLIC BLOOD PRESSURE: 169 MMHG | HEIGHT: 71 IN | HEART RATE: 92 BPM | WEIGHT: 315 LBS

## 2018-02-21 DIAGNOSIS — G47.33 OSA (OBSTRUCTIVE SLEEP APNEA): Primary | ICD-10-CM

## 2018-02-21 PROCEDURE — 99201 ZZC OFFICE/OUTPT VISIT, NEW, LEVEL I: CPT | Performed by: PSYCHIATRY & NEUROLOGY

## 2018-02-21 NOTE — PATIENT INSTRUCTIONS

## 2018-02-21 NOTE — PROGRESS NOTES
Visit Date:   2018      Mr. Michelle Pate is 22 years old.  He has a history of sleep disordered breathing, concerning for risk of long-term cardiovascular disease.  He did exceptionally well on auto titration CPAP; however, he has not gotten new supplies since the last time we saw him several years ago and understandably was having quite a bit of trouble with mask leakage.  We got him a new mask, and he indicates that that should be working more effectively.  Over the last 30 days, his apnea-hypopnea index is less than 1.  As noted, his leak is higher, but that should improve with a new mask that we got him yesterday.      More concerning is his sleep schedule.  He works overnights as a  at [employer].  We discussed strategies by which we could help him better consolidate his sleep, and particularly using melatonin when he comes home and is ready to go to bed in the morning, but also to try to keep consistent wake and sleep schedules, both when he is working as well as when he is not working.  The patient indicates that he understands.  He indicates that he is alert when operating a motor vehicle and will in the future continue to get new supplies for his CPAP every 6 months so to as to avoid the air leakage challenge.      It was a great privilege being asked to participate in his care.  I would like to see him back annually or earlier if he has any problems.      Fifteen minutes were spent on the patient today, greater than 50% of the time in counseling and coordination of care.         RD CRESPO MD             D: 2018   T: 2018   MT: SRINIVASAN      Name:     MICHELLE PATEKO   MRN:      [id number]        Account:      [id number]   :      1995           Visit Date:   2018      Document: [id number]

## 2018-02-21 NOTE — MR AVS SNAPSHOT
After Visit Summary   2/21/2018    Kirk Chiu    MRN: [id number]           Patient Information     Date Of Birth          1995        Visit Information        Provider Department      2/21/2018 9:00 AM Ronny Mclaughlin MD [facility]        Today's Diagnoses     ADELA (obstructive sleep apnea)    -  1      Care Instructions      Your BMI is Body mass index is 58.16 kg/(m^2).  Weight management is a personal decision.  If you are interested in exploring weight loss strategies, the following discussion covers the approaches that may be successful. Body mass index (BMI) is one way to tell whether you are at a healthy weight, overweight, or obese. It measures your weight in relation to your height.  A BMI of 18.5 to 24.9 is in the healthy range. A person with a BMI of 25 to 29.9 is considered overweight, and someone with a BMI of 30 or greater is considered obese. More than two-thirds of American adults are considered overweight or obese.  Being overweight or obese increases the risk for further weight gain. Excess weight may lead to heart disease and diabetes.  Creating and following plans for healthy eating and physical activity may help you improve your health.  Weight control is part of healthy lifestyle and includes exercise, emotional health, and healthy eating habits. Careful eating habits lifelong are the mainstay of weight control. Though there are significant health benefits from weight loss, long-term weight loss with diet alone may be very difficult to achieve- studies show long-term success with dietary management in less than 10% of people. Attaining a healthy weight may be especially difficult to achieve in those with severe obesity. In some cases, medications, devices and surgical management might be considered.  What can you do?  If you are overweight or obese and are interested in methods for weight loss, you should discuss this with your provider.      Consider reducing daily calorie intake by 500 calories.     Keep a food journal.     Avoiding skipping meals, consider cutting portions instead.    Diet combined with exercise helps maintain muscle while optimizing fat loss. Strength training is particularly important for building and maintaining muscle mass. Exercise helps reduce stress, increase energy, and improves fitness. Increasing exercise without diet control, however, may not burn enough calories to loose weight.       Start walking three days a week 10-20 minutes at a time    Work towards walking thirty minutes five days a week     Eventually, increase the speed of your walking for 1-2 minutes at time    In addition, we recommend that you review healthy lifestyles and methods for weight loss available through the National Institutes of Health patient information sites:  http://win.niddk.nih.gov/publications/index.htm    And look into health and wellness programs that may be available through your health insurance provider, employer, local community center, or valentine club.    Weight management plan: Patient was referred to their PCP to discuss a diet and exercise plan.              Follow-ups after your visit        Your next 10 appointments already scheduled     Mar 15, 2018  9:30 AM CDT   Return Sleep Patient with Ronny Mclaughlin MD   [facility] ([facility])    [address] [address] [phone]              Who to contact     If you have questions or need follow up information about today's clinic visit or your schedule please contact [facility] directly at [phone].  Normal or non-critical lab and imaging results will be communicated to you by MyChart, letter or phone within 4 business days after the clinic has received the results. If you do not hear from us within 7 days, please contact the clinic through MyChart or phone. If you have a critical  "or abnormal lab result, we will notify you by phone as soon as possible.  Submit refill requests through (In[employer] or call your pharmacy and they will forward the refill request to us. Please allow 3 business days for your refill to be completed.          Additional Information About Your Visit        [employer] Information     (In[employer] gives you secure access to your electronic health record. If you see a primary care provider, you can also send messages to your care team and make appointments. If you have questions, please call your primary care clinic.  If you do not have a primary care provider, please call [phone] and they will assist you.        Care EveryWhere ID     This is your Care EveryWhere ID. This could be used by other organizations to access your [facility] medical records  [id number]        Your Vitals Were     Pulse Respirations Height Pulse Oximetry BMI (Body Mass Index)       92 18 1.803 m (5' 11\") 97% 58.16 kg/m2        Blood Pressure from Last 3 Encounters:   02/21/18 (!) 169/109   09/28/17 (!) 174/94   01/19/17 (!) 180/104    Weight from Last 3 Encounters:   02/21/18 (!) 189.1 kg (417 lb)   01/15/18 (!) 181.4 kg (400 lb)   09/28/17 (!) 185.6 kg (409 lb 4 oz)              We Performed the Following     Comprehensive DME        Primary Care Provider Office Phone # Fax #    Kendra Chiang [phone] [address]  [place] [address]        Goals        General    \"I will take my blood pressure once a day and bring in the readings to my doctors.\"  (pt-stated)     Notes - Note edited  11/11/2014 11:11 AM by Anny Burton RN    As of today's date 10/28/2014 goal is met at 0 - 25%.   Goal Status:  Ongoing  As of today's date 11/11/2014 goal is met at 76 - 100%.   Goal Status:  Ongoing        Equal Access to Services     RONNI CHRISTIANSON AH: Davidson Cash, marie tafoya, john keller la'aan ah. So [facility] " [phone].    ATENCIÓN: Si reece ham, tiene a addison disposición servicios gratuitos de asistencia lingüística. Katie dubon [phone].    We comply with applicable federal civil rights laws and [place] laws. We do not discriminate on the basis of race, color, national origin, age, disability, sex, sexual orientation, or gender identity.            Thank you!     Thank you for choosing [facility]  for your care. Our goal is always to provide you with excellent care. Hearing back from our patients is one way we can continue to improve our services. Please take a few minutes to complete the written survey that you may receive in the mail after your visit with us. Thank you!             Your Updated Medication List - Protect others around you: Learn how to safely use, store and throw away your medicines at www.disposemymeds.org.          This list is accurate as of 2/21/18  9:19 AM.  Always use your most recent med list.                   Brand Name Dispense Instructions for use Diagnosis    albuterol 108 (90 BASE) MCG/ACT Inhaler    PROAIR HFA/PROVENTIL HFA/VENTOLIN HFA     Inhale 2 puffs into the lungs every 6 hours        amLODIPine 10 MG tablet    NORVASC    90 tablet    Take 1 tablet (10 mg) by mouth daily    Essential hypertension with goal blood pressure less than 140/90       carvedilol 25 MG tablet    COREG    180 tablet    Take 1 tablet (25 mg) by mouth 2 times daily (with meals)    Benign essential hypertension, Essential hypertension with goal blood pressure less than 140/90       cetirizine 10 MG tablet    zyrTEC     Take 10 mg by mouth daily        lisinopril 40 MG tablet    PRINIVIL/ZESTRIL    90 tablet    Take 1 tablet (40 mg) by mouth daily Needs office visit please    Essential hypertension with goal blood pressure less than 140/90       MULTIVITAMIN & MINERAL PO      Take 1 tablet by mouth daily        spironolactone 50 MG tablet    ALDACTONE    90 tablet    Take 1 tablet (50  mg) by mouth daily    Essential hypertension with goal blood pressure less than 140/90       vitamin D 1000 UNITS capsule      Take 1 capsule by mouth daily

## 2018-02-21 NOTE — NURSING NOTE
"Chief Complaint   Patient presents with     CPAP Follow Up     Follow up miguel angel       Initial BP (!) 169/109  Pulse 92  Resp 18  Ht 1.803 m (5' 11\")  Wt (!) 189.1 kg (417 lb)  SpO2 97%  BMI 58.16 kg/m2 Estimated body mass index is 58.16 kg/(m^2) as calculated from the following:    Height as of this encounter: 1.803 m (5' 11\").    Weight as of this encounter: 189.1 kg (417 lb).  Medication Reconciliation: complete   ESS 3  Sarina Flynn MA      "

## 2018-02-28 ENCOUNTER — DOCUMENTATION ONLY (OUTPATIENT)
Dept: SLEEP MEDICINE | Facility: CLINIC | Age: 23
End: 2018-02-28

## 2018-02-28 DIAGNOSIS — G47.33 OSA (OBSTRUCTIVE SLEEP APNEA): Primary | ICD-10-CM

## 2019-12-09 ENCOUNTER — HEALTH MAINTENANCE LETTER (OUTPATIENT)
Age: 24
End: 2019-12-09

## 2021-01-14 ENCOUNTER — HEALTH MAINTENANCE LETTER (OUTPATIENT)
Age: 26
End: 2021-01-14

## 2021-08-24 ENCOUNTER — WALK IN (OUTPATIENT)
Dept: URGENT CARE | Age: 26
End: 2021-08-24

## 2021-08-24 ENCOUNTER — IMAGING SERVICES (OUTPATIENT)
Dept: GENERAL RADIOLOGY | Age: 26
End: 2021-08-24
Attending: NURSE PRACTITIONER

## 2021-08-24 ENCOUNTER — APPOINTMENT (OUTPATIENT)
Dept: LAB | Age: 26
End: 2021-08-24

## 2021-08-24 VITALS
BODY MASS INDEX: 44.1 KG/M2 | DIASTOLIC BLOOD PRESSURE: 80 MMHG | OXYGEN SATURATION: 97 % | HEIGHT: 71 IN | WEIGHT: 315 LBS | TEMPERATURE: 99 F | HEART RATE: 93 BPM | RESPIRATION RATE: 18 BRPM | SYSTOLIC BLOOD PRESSURE: 100 MMHG

## 2021-08-24 DIAGNOSIS — M10.9 ACUTE GOUT OF LEFT ANKLE, UNSPECIFIED CAUSE: ICD-10-CM

## 2021-08-24 DIAGNOSIS — M25.572 ACUTE LEFT ANKLE PAIN: Primary | ICD-10-CM

## 2021-08-24 DIAGNOSIS — M25.572 ACUTE LEFT ANKLE PAIN: ICD-10-CM

## 2021-08-24 LAB
ALBUMIN SERPL-MCNC: 4.2 G/DL (ref 3.6–5.1)
ALBUMIN/GLOB SERPL: 1 {RATIO} (ref 1–2.4)
ALP SERPL-CCNC: 59 UNITS/L (ref 45–117)
ALT SERPL-CCNC: 56 UNITS/L
ANION GAP SERPL CALC-SCNC: 14 MMOL/L (ref 10–20)
AST SERPL-CCNC: 51 UNITS/L
BASOPHILS # BLD: 0 K/MCL (ref 0–0.3)
BASOPHILS NFR BLD: 0 %
BILIRUB SERPL-MCNC: 1.1 MG/DL (ref 0.2–1)
BUN SERPL-MCNC: 15 MG/DL (ref 6–20)
BUN/CREAT SERPL: 15 (ref 7–25)
CALCIUM SERPL-MCNC: 9.6 MG/DL (ref 8.4–10.2)
CHLORIDE SERPL-SCNC: 99 MMOL/L (ref 98–107)
CO2 SERPL-SCNC: 26 MMOL/L (ref 21–32)
CREAT SERPL-MCNC: 1 MG/DL (ref 0.67–1.17)
DEPRECATED RDW RBC: 39.2 FL (ref 39–50)
EOSINOPHIL # BLD: 0.1 K/MCL (ref 0–0.5)
EOSINOPHIL NFR BLD: 1 %
ERYTHROCYTE [DISTWIDTH] IN BLOOD: 13.3 % (ref 11–15)
FASTING DURATION TIME PATIENT: ABNORMAL H
GFR SERPLBLD BASED ON 1.73 SQ M-ARVRAT: >90 ML/MIN/1.73M2
GLOBULIN SER-MCNC: 4.1 G/DL (ref 2–4)
GLUCOSE SERPL-MCNC: 120 MG/DL (ref 65–99)
HCT VFR BLD CALC: 37.1 % (ref 39–51)
HGB BLD-MCNC: 12.4 G/DL (ref 13–17)
LYMPHOCYTES # BLD: 1.1 K/MCL (ref 1–4.8)
LYMPHOCYTES NFR BLD: 9 %
MCH RBC QN AUTO: 27.8 PG (ref 26–34)
MCHC RBC AUTO-ENTMCNC: 33.4 G/DL (ref 32–36.5)
MCV RBC AUTO: 83.2 FL (ref 78–100)
MONOCYTES # BLD: 0.7 K/MCL (ref 0.3–0.9)
MONOCYTES NFR BLD: 6 %
NEUTROPHILS # BLD: 9.9 K/MCL (ref 1.8–7.7)
NEUTROPHILS NFR BLD: 84 %
PLATELET # BLD AUTO: 353 K/MCL (ref 140–450)
POTASSIUM SERPL-SCNC: 4.2 MMOL/L (ref 3.4–5.1)
PROT SERPL-MCNC: 8.3 G/DL (ref 6.4–8.2)
RBC # BLD: 4.46 MIL/MCL (ref 4.5–5.9)
SODIUM SERPL-SCNC: 135 MMOL/L (ref 135–145)
URATE SERPL-MCNC: 13.7 MG/DL (ref 3.5–7.2)
WBC # BLD: 11.8 K/MCL (ref 4.2–11)

## 2021-08-24 PROCEDURE — L4361 PNEUMA/VAC WALK BOOT PRE OTS: HCPCS | Performed by: NURSE PRACTITIONER

## 2021-08-24 PROCEDURE — 73610 X-RAY EXAM OF ANKLE: CPT | Performed by: RADIOLOGY

## 2021-08-24 PROCEDURE — 36415 COLL VENOUS BLD VENIPUNCTURE: CPT | Performed by: INTERNAL MEDICINE

## 2021-08-24 PROCEDURE — 99204 OFFICE O/P NEW MOD 45 MIN: CPT | Performed by: NURSE PRACTITIONER

## 2021-08-24 PROCEDURE — 84550 ASSAY OF BLOOD/URIC ACID: CPT | Performed by: INTERNAL MEDICINE

## 2021-08-24 PROCEDURE — 80053 COMPREHEN METABOLIC PANEL: CPT | Performed by: INTERNAL MEDICINE

## 2021-08-24 PROCEDURE — 85025 COMPLETE CBC W/AUTO DIFF WBC: CPT | Performed by: INTERNAL MEDICINE

## 2021-08-24 RX ORDER — HYDROCHLOROTHIAZIDE 25 MG/1
25 TABLET ORAL
COMMUNITY
Start: 2021-07-06

## 2021-08-24 RX ORDER — PREDNISONE 10 MG/1
TABLET ORAL
Qty: 32 TABLET | Refills: 0 | Status: SHIPPED | OUTPATIENT
Start: 2021-08-24

## 2021-08-24 RX ORDER — ALBUTEROL SULFATE 90 UG/1
2 AEROSOL, METERED RESPIRATORY (INHALATION)
COMMUNITY

## 2021-08-24 RX ORDER — CETIRIZINE HYDROCHLORIDE 10 MG/1
10 TABLET ORAL DAILY
COMMUNITY

## 2021-08-24 RX ORDER — AMLODIPINE BESYLATE 10 MG/1
10 TABLET ORAL DAILY
COMMUNITY
Start: 2017-09-25

## 2021-08-24 RX ORDER — TORSEMIDE 10 MG/1
20 TABLET ORAL
COMMUNITY
Start: 2021-07-06

## 2021-08-24 RX ORDER — SACUBITRIL AND VALSARTAN 97; 103 MG/1; MG/1
1 TABLET, FILM COATED ORAL
COMMUNITY
Start: 2021-08-17

## 2021-08-24 RX ORDER — SACUBITRIL AND VALSARTAN 97; 103 MG/1; MG/1
TABLET, FILM COATED ORAL
COMMUNITY
Start: 2021-08-17

## 2021-08-24 RX ORDER — CARVEDILOL 25 MG/1
50 TABLET ORAL
COMMUNITY
Start: 2017-10-25

## 2021-08-24 RX ORDER — LISINOPRIL 40 MG/1
40 TABLET ORAL DAILY
COMMUNITY
Start: 2017-12-27

## 2021-10-24 ENCOUNTER — HEALTH MAINTENANCE LETTER (OUTPATIENT)
Age: 26
End: 2021-10-24

## 2022-02-13 ENCOUNTER — HEALTH MAINTENANCE LETTER (OUTPATIENT)
Age: 27
End: 2022-02-13

## 2022-09-06 NOTE — PROGRESS NOTES
CPAP Follow-Up Visit:    Date on this visit: 9/7/2022    Kirk SCHWARZ Lilliam Chiu has a follow-up visit today to review his CPAP use for ADELA and management of shift work sleep disorder. He was initially seen for evaluation of ADELA due to observed apnea when in the hospital for hypertensive crisis.     Previous Study Results:   Date: 10/30/2014.  Weight 384 pounds.  /hr with 44 minutes spent with an oxygen saturation of less than 89%.   He had a subsequent titration study that showed CPAP 15 cm was effective.    His machine is from 2014. He feels the equipment is beat up. His hose is taped up and the mask shell is broken where the straps connect. He has been occasionally gotten new supplies online.   He sleeps on his stomach, which contributes to leak a little. He gets a dry throat if he sleeps on his back.     PAP machine: RespirEnvoys System One. Pressure settings: 15 cm    The compliance data shows that the patient used the CPAP for 30/30 nights, 86.7% of nights for >4 hours.  The 90th% pressure is 15 cm.  The average time in large leak is 7 min.  The average nightly usage is 7:06.  The average AHI is 1.6/hr. The snore index is often over 400.         Interface:  Mask: Simplus full face mask. He used to use the Mirage Quattro and thinks that one was better.  Chin strap: No  Leak: Yes, holes in hose and mask cushion is pulling away from the shell.  Using Humidifier: no  Condensation in hose or mask: No     Difficulties with therapy:    [-] Snoring with CPAP: girlfriend has not commented  [-] Difficulty tolerating the pressure:  [-] Epistaxis/dry nose:   [-] Nasal congestion:  [+] Dry mouth:  [-] Mouth breathing:   [+] Pain/skin breakdown: on neck from the strap     Improvements noted with CPAP:   [+] waking up more refreshed  [+] sleeping better with less arousals  [+] nocturia improved   [+] improved energy level during the day    Weight change since sleep study: 442 lbs    He works at [employer] working the  night shift. He says he is tolerating night shift pretty well. On days off, he stays up until 2-4 AM or later.  He gets off of work at 5:30 AM. He does chores. He goes to bed around 11 AM or noon. He wakes 7-8 PM. He does nap before work and on days off, with CPAP.   He occasionally takes melatonin. He occasionally has some difficulty falling asleep, but no problems staying asleep.  He does not drink much caffeine.      Past medical/surgical history, family history, social history, medications and allergies were reviewed.      Problem List:  Patient Active Problem List    Diagnosis Date Noted     Benign essential hypertension 10/24/2016     Priority: Medium     Secondary cardiomyopathy (H) 06/06/2016     Priority: Medium     ADELA (obstructive sleep apnea)      Priority: Medium     Health Care Home 10/28/2014     Priority: Medium     State Tier Level:  unknown  Status:  Closed Dec 17, 2014  Care Coordinator:  Aixa Burton    See Letters for [facility] Care Plan  Date:  October 28, 2014           Morbid obesity (H)      Priority: Medium     HTN, goal below 140/90      Priority: Medium     Pulmonary nodule      Priority: Medium     Dysmetabolic syndrome X 10/20/2014     Priority: Medium     Allergic rhinitis 09/13/2011     Priority: Medium     Problem list name updated by automated process. Provider to review       Mild intermittent asthma 11/20/2006     Priority: Medium        Impression/Plan:    (G47.33) ADELA (obstructive sleep apnea)  (primary encounter diagnosis)  Comment: Kirk presents to re-establish care for severe ADELA. He was last seen in 2018 by Dr. Mclaughlin. He is using CPAP faithfully but has not replaced supplies in a long time. His hose and mask have holes. The download shows his apnea is well treated most of the time. In the last week, he had one brief period with a dense cluster of apnea, which likely reflects trying to sleep supine. His snore index is very high which could be related to leak or actual  snoring (although snoring is not reported). He has gained 60 pounds since his sleep study. CO2 on metabolic panel was 23. SpO2 was 98% in clinic today. His machine is under recall. He has not used SoClean. He also does not use the humidifier because it seemed too hot.  Plan: Comprehensive DME        I changed his pressures to auto CPAP 15-18 cm. A prescription was written for new supplies and he was directed to [facility] right after the appointment. We reviewed recommendations for cleaning and replacing supplies. I changed his humidity settings to the lowest option and showed him how to adjust those settings. He will try it again. We talked about the recall and he was given information to register his machine for replacement. He said he would prefer to get a new machine from [facility], so an order was placed for a replacement machine. He was informed that there is a lengthy wait list due to the recall and he was ok with that.       (G47.26) Shift work sleep disorder  Comment: He works nights. He tolerates it ok, but occasionally has some difficulty falling asleep. He takes 10 mg melatonin. He usually goes to bed in the middle of the day. He has black-out curtains in his room.  Plan: I recommended that he go to bed right when he gets home from work. He was encouraged avoid as much light exposure as possible from when he gets off of work to when he wakes after sleep. May take a nap before going in to work as well. On days off, try to keep a delayed sleep schedule to allow for as much overlapping sleep time as possible between days on and days off. We talked about using a SAD lamp 10,000 lux in the evening to help push his circadian rhythms later. He was also encouraged to take 3-5 mg melatonin at bedtime if going to bed fairly late (after 2 AM or so).        He will follow up with me in about 1 year(s).     47 minutes were spent on the date of the encounter doing chart review, history and exam, documentation and further  activities as noted above.     Bennett Goltz, PA-C    CC: No ref. provider found

## 2022-09-07 ENCOUNTER — OFFICE VISIT (OUTPATIENT)
Dept: SLEEP MEDICINE | Facility: CLINIC | Age: 27
End: 2022-09-07
Payer: COMMERCIAL

## 2022-09-07 ENCOUNTER — TELEPHONE (OUTPATIENT)
Dept: SLEEP MEDICINE | Facility: CLINIC | Age: 27
End: 2022-09-07

## 2022-09-07 VITALS
HEIGHT: 72 IN | OXYGEN SATURATION: 98 % | SYSTOLIC BLOOD PRESSURE: 144 MMHG | WEIGHT: 315 LBS | DIASTOLIC BLOOD PRESSURE: 84 MMHG | BODY MASS INDEX: 42.66 KG/M2 | HEART RATE: 74 BPM

## 2022-09-07 DIAGNOSIS — G47.33 OSA (OBSTRUCTIVE SLEEP APNEA): Primary | ICD-10-CM

## 2022-09-07 DIAGNOSIS — G47.26 SHIFT WORK SLEEP DISORDER: ICD-10-CM

## 2022-09-07 PROCEDURE — 99204 OFFICE O/P NEW MOD 45 MIN: CPT | Performed by: PHYSICIAN ASSISTANT

## 2022-09-07 RX ORDER — AMLODIPINE BESYLATE 10 MG/1
10 TABLET ORAL
COMMUNITY
Start: 2021-11-19

## 2022-09-07 RX ORDER — DAPAGLIFLOZIN 10 MG/1
10 TABLET, FILM COATED ORAL
COMMUNITY
Start: 2022-06-25

## 2022-09-07 RX ORDER — CLONIDINE 0.3 MG/24H
PATCH, EXTENDED RELEASE TRANSDERMAL
COMMUNITY
Start: 2022-08-01 | End: 2023-07-06

## 2022-09-07 RX ORDER — HYDRALAZINE HYDROCHLORIDE 25 MG/1
TABLET, FILM COATED ORAL
COMMUNITY
Start: 2022-06-24

## 2022-09-07 NOTE — PATIENT INSTRUCTIONS
Each of us has a built in tendency to find it easier to stay up late at night or get up early in the morning.  Being a  night owl  or  early bird  is a function of our internal body clock.  When you are forced to keep a sleep schedule that goes against your typical habits or if you change your sleep schedule on different days of the week, insomnia can be the result.  Try the following changes if you work the night shift to see if you can improve your sleep patterns:  Nap before work  Drink caffeine before driving to work  Brighten the lights during the first half of your shift if you can. Use a SAD lamp, 10,000 lux intensity.  Get 10 minutes of exposure hourly. Keep the light 1-2 arm lengths from you in your peripheral vision.  Dim light in work areas for the last few hours of the night if you can  If the sun is up for your trip home wear dark UV blocking sunglasses  Make getting your sleep a priority .you ll feel better during the time you re awake  Schedule things in your personal life around your sleep schedule and talk about it with your family. This will help your family understand your need to sleep seven to eight hours a day.  May sure your sleeping area is right to get good sleep by turning off your phone, darken the room, use a fan to drown out excessive house noise and clear your schedule until you ve gotten your sleep.    If you are too tired or sleepy to drive after a night shift, nap for 15 - 30 minutes before leaving work or call for a ride.   Remember that you will be affected by lack of sleep if you have been awake for more than 16 hours so be extra careful.    Keep a late sleep schedule on days off so there is some overlap in you sleep schedule on days off of work and days you do work.   Use 3-5 mg melatonin at bedtime if you are going to bed after 2 AM.          Your sleep apnea treatment may be affected by device recall    Our records show that you may have a [employer] RespirExcelimmune CPAP for the  treatment of sleep apnea. Many of these devices have been recalled* by the  for replacement. [facility] recommends:     1) If you are using a Resmed device, continue using the device.  2) If you have a [employer] Respironics device, register your device for confirmation of type of device and repair of the device at https://www.[employer]/healthcare/e/sleep/communications/src-update -if you cannot use link, call [phone].  The website will assist you in obtaining the serial number for registration.   3) If you are using a [employer] Respironics CPAP or Bilevel PAP device and you do not have immediate breathing, driving or cardiovascular risks without the device, consider stopping use of the device after verification that is has been recalled. Discuss this decision with your medical provider if you are uncertain about your medical risks.  4) If you are not using Respironics CPAP but are using a Respironics advanced device for breathing support (AVAPS, ASV, Bilevel PAP), continue using the device and review 5 and 6 below).     5) If you continue the device, do not include ozone generating  connected to PAP devices.  6) Bacterial filters to reduce exposure to particulates are sometimes cumbersome to use and are not currently recommended by the .    ?       You may also choose discuss with your provider alternative approaches to treatment.      *[employer]pirKelways is voluntarily recalling the below devices due to two (2) issues related to the polyester-based polyurethane (PE-PUR) sound abatement foam used in [employer]ison Continuous and Non-Continuous Ventilators: 1) PE-PUR foam may degrade into particles which may enter the device's the air pathway and be ingested or inhaled by the user, and 2) the PE-PUR foam may off-gas certain chemicals. The foam degradation may be exacerbated by use of unapproved cleaning methods, such as ozone (see FDA safety communication on use of Ozone  ), and off-gassing may occur during initial operation and may possibly continue throughout the device's useful life.   These issues can result in serious injury which can be life-threatening, cause permanent impairment, and/or require medical intervention to preclude permanent impairment. To date, [employer] has received several complaints regarding the presence of black debris/particles within the airpath circuit (extending from the device outlet, humidifier, tubing, and mask). [employer]ison also has received reports of headache, upper airway irritation, cough, chest pressure and sinus infection. The potential risks of particulate exposure include: Irritation (skin, eye, and respiratory tract), inflammatory response, headache, asthma, adverse effects to other organs (e.g. kidneys and liver) and toxic carcinogenic affects. The potential risks of chemical exposure due to off-gassing include: headache/dizziness, irritation (eyes, nose, respiratory tract, skin), hypersensitivity, nausea/vomiting, toxic and carcinogenic effects. There have been no reports of death as a result of these issues.    Actions to be taken:  Discontinue the use of your device.  Do not continue to use ozone  with the device.     [place] affected devices on the recall website, www.[employer].Buffer/SRC-update    i. The website provides current information on the status of the recall and how  to receive permanent corrective action to address the two issues.    ii. The website also provides instructions on how to locate an affected device  Serial Number and will guide users through the registration process.    iii. In the , call [phone] Service Hotline if you cannot visit the website

## 2022-09-07 NOTE — TELEPHONE ENCOUNTER
CALLED PT AND LVM EXPLAINING THAT HIS PRESSURE WAS CHANGED REMOTELY FROM 15 TO 15-18 CM H2O AND THAT I WOULD WORK UP THE REPLACEMENT NOTE AND PUT HIM ON THE WAITLIST. LEFT MAIN NUMBER FOR ANY QUESTIONS

## 2022-09-07 NOTE — NURSING NOTE
Chief Complaint   Patient presents with     CPAP Follow Up     Need supplies       Initial BP (!) 142/86   Pulse 74   Ht 1.829 m (6')   Wt (!) 200.5 kg (442 lb)   SpO2 98%   BMI 59.95 kg/m   Estimated body mass index is 59.95 kg/m  as calculated from the following:    Height as of this encounter: 1.829 m (6').    Weight as of this encounter: 200.5 kg (442 lb).    Medication Reconciliation: complete  ESS 1  Neck circumference: 56 centimeters.  Kendra Hays MA

## 2022-10-15 ENCOUNTER — HEALTH MAINTENANCE LETTER (OUTPATIENT)
Age: 27
End: 2022-10-15

## 2023-03-26 ENCOUNTER — HEALTH MAINTENANCE LETTER (OUTPATIENT)
Age: 28
End: 2023-03-26

## 2023-04-21 ENCOUNTER — TELEPHONE (OUTPATIENT)
Dept: SLEEP MEDICINE | Facility: CLINIC | Age: 28
End: 2023-04-21
Payer: COMMERCIAL

## 2023-04-21 NOTE — TELEPHONE ENCOUNTER
Patient scheduled to  replacement CPAP from [facility] showroom on 4/29/23 from 9 am-12 pm.

## 2023-04-28 ENCOUNTER — DOCUMENTATION ONLY (OUTPATIENT)
Dept: SLEEP MEDICINE | Facility: CLINIC | Age: 28
End: 2023-04-28
Payer: COMMERCIAL

## 2023-04-28 DIAGNOSIS — G47.33 OBSTRUCTIVE SLEEP APNEA (ADULT) (PEDIATRIC): Primary | ICD-10-CM

## 2023-04-29 NOTE — PROGRESS NOTES
Patient was offered choice of vendor and chose [facility].  Patient Kirk Chiu was set up at [place] on April 29, 2023. Patient received a Resmed Aircurve 10 Pressures were set at 15-18 cm H2O.   Patient s ramp is 7 cm H2O for Auto and FLEX/EPR is EPR, 2.  Patient received a Resmed Mask name: MIRAGE QUATRO  Nasal mask size Medium, heated tubing and heated humidifier.  Patient has the following compliance requirements: none  Patient has a follow up on TBD with Bennett Goltz, PA-C. Tracy L Jackson

## 2023-07-06 ENCOUNTER — OFFICE VISIT (OUTPATIENT)
Dept: URGENT CARE | Facility: URGENT CARE | Age: 28
End: 2023-07-06
Payer: COMMERCIAL

## 2023-07-06 VITALS
BODY MASS INDEX: 56.15 KG/M2 | DIASTOLIC BLOOD PRESSURE: 84 MMHG | HEART RATE: 100 BPM | SYSTOLIC BLOOD PRESSURE: 144 MMHG | WEIGHT: 315 LBS | TEMPERATURE: 98.8 F

## 2023-07-06 DIAGNOSIS — R11.2 NAUSEA AND VOMITING, UNSPECIFIED VOMITING TYPE: ICD-10-CM

## 2023-07-06 DIAGNOSIS — R19.7 DIARRHEA, UNSPECIFIED TYPE: ICD-10-CM

## 2023-07-06 DIAGNOSIS — R10.84 ABDOMINAL PAIN, GENERALIZED: Primary | ICD-10-CM

## 2023-07-06 PROCEDURE — 99203 OFFICE O/P NEW LOW 30 MIN: CPT

## 2023-07-06 RX ORDER — ONDANSETRON 4 MG/1
4 TABLET, ORALLY DISINTEGRATING ORAL EVERY 8 HOURS PRN
Qty: 30 TABLET | Refills: 0 | Status: SHIPPED | OUTPATIENT
Start: 2023-07-06

## 2023-07-06 RX ORDER — LOPERAMIDE HYDROCHLORIDE 2 MG/1
2 TABLET ORAL 4 TIMES DAILY PRN
Qty: 30 TABLET | Refills: 0 | Status: SHIPPED | OUTPATIENT
Start: 2023-07-06

## 2023-07-06 NOTE — LETTER
July 6, 2023      Kirk Chiu  [address]   [place] [address]        To Whom It May Concern:    Kirkstephy Tompkinsfabián Chiu  was seen on July 6, 2023.  Please excuse him from work until July 12th due to illness.        Sincerely,        Guzman Guthrie, ZOHAIB CNP

## 2023-07-06 NOTE — PROGRESS NOTES
ASSESSMENT:  (R10.84) Abdominal pain, generalized  (primary encounter diagnosis)    (R19.7) Diarrhea, unspecified type  Plan: loperamide (IMODIUM A-D) 2 MG tablet    (R11.2) Nausea and vomiting, unspecified vomiting type  Plan: ondansetron (ZOFRAN ODT) 4 MG ODT tab    PLAN:  Informed the patient to continue to drink plenty of fluids aiming for 64 ounces of water per day.  We discussed the need for him to avoid Gatorade and Powerade as those are high in sugar.  We also discussed drinking Pedialyte to help with hydration.  Instructed to follow bland diet and advance as tolerated.  [place] diet patient instructions discussed and provided.  Informed him to take Imodium and Zofran as prescribed.  Work note provided.  Discussed the need to return to clinic with any new or worsening symptoms.  Patient acknowledged his understanding of the above plan.    The use of [employer] dictation services may have been used to construct the content in this note; any grammatical or spelling errors are non-intentional. Please contact the author of this note directly if you are in need of any clarification.      [facility] [place]    SUBJECTIVE:   Kirk KARISHMA Lilliam Chiu is a 27 year old male with symptoms of abdominal pain entire abdomen, cramping, vomiting and diarrhea which began 6 days ago.    Symptoms are gradual onset and moderate.    Aggravating factors: nothing.    Alleviating factors:nothing  Associated symptoms:  Fever: low grade fevers  Diarrhea:  consists of 5 stools/day and is persisting  Appetite: decreased  Risk factors: sick contacts.  Patient reports his girlfriend had the same symptoms but recovered quicker.    ROS:  Negative except noted above.     OBJECTIVE:   BP (!) 144/84   Pulse 100   Temp 98.8  F (37.1  C) (Tympanic)   Wt (!) 187.8 kg (414 lb)   BMI 56.15 kg/m    GENERAL APPEARANCE: healthy, alert and no distress  EYES: EOMI,  PERRL, conjunctiva clear  HENT: nose and mouth without  erythema, ulcers or lesions and oral mucous membranes moist, no erythema noted  RESP: lungs clear to auscultation - no rales, rhonchi or wheezes  CV: regular rates and rhythm, normal S1 S2, no murmur noted  ABDOMEN:  soft, nontender, no HSM or masses and bowel sounds normal  SKIN: no suspicious lesions or rashes

## 2023-07-06 NOTE — PATIENT INSTRUCTIONS
Continue to drink plenty of fluids.  Aim for 64 ounces of water per day.  Avoid Gatorade and Powerade as those are high in sugar.  You can drink Pedialyte as well to help with hydration.  Follow a bland diet and advance as tolerated.  Take the Imodium and Zofran as prescribed.

## 2024-01-19 ENCOUNTER — OFFICE VISIT (OUTPATIENT)
Dept: URGENT CARE | Facility: URGENT CARE | Age: 29
End: 2024-01-19
Payer: COMMERCIAL

## 2024-01-19 VITALS
TEMPERATURE: 97.6 F | SYSTOLIC BLOOD PRESSURE: 188 MMHG | HEART RATE: 61 BPM | OXYGEN SATURATION: 98 % | DIASTOLIC BLOOD PRESSURE: 110 MMHG

## 2024-01-19 DIAGNOSIS — I10 HTN, GOAL BELOW 140/90: Chronic | ICD-10-CM

## 2024-01-19 DIAGNOSIS — L30.9: Primary | ICD-10-CM

## 2024-01-19 PROCEDURE — 99213 OFFICE O/P EST LOW 20 MIN: CPT | Performed by: NURSE PRACTITIONER

## 2024-01-19 NOTE — PROGRESS NOTES
Assessment & Plan     Dermatitis of genitalia in male    HTN, goal below 140/90     Patient Instructions   Butt paste cream.    Mix equal parts hydrocortisone, desitin and lotrimin in a covered container.    Apply liberally to affected area several times a day.      BP uncontrolled.    Has not taken BP meds in a few days.    Follow up with your PCP to get medications checked.    Try to reduce sodium in your diet.    Will monitor at home  recommend follow up with PCP if remains elevated.          Return in about 1 week (around 1/26/2024) for with regular provider if symptoms persist.    ZOHAIB Munroe CNP  [facility] [place] Bradley is a 28 year old male who presents to clinic today for the following health issues:  Chief Complaint   Patient presents with    Urgent Care     Pt reports dry rash in genital area for 2 weeks.     HPI    Rash    Onset of rash was 2 week(s) ago.   Course of illness is worsening.  Severity moderate  Current and Associated symptoms: itching, burning, dry, and red   Location of the rash: penis.  Previous history of a similar rash? No  Recent exposure history: none known  Denies exposure to: none known  Associated symptoms include: nothing.  Treatment measures tried include: moisturizer   No new partners or products used.    Was very itchy and now has dry skin with fissures.   Hurts to retract foreskin.       Review of Systems  Constitutional, HEENT, cardiovascular, pulmonary, gi and gu systems are negative, except as otherwise noted.      Objective    BP (!) 188/110   Pulse 61   Temp 97.6  F (36.4  C) (Tympanic)   SpO2 98%   Physical Exam   GENERAL: alert and no distress  NECK: no adenopathy, no asymmetry, masses, or scars  RESP: lungs clear to auscultation - no rales, rhonchi or wheezes  CV: regular rate and rhythm, normal S1 S2, no S3 or S4, no murmur, click or rub, no peripheral edema   (male): testicles normal without atrophy or masses, no  hernias, and penis normal without urethral discharge  MS: no gross musculoskeletal defects noted, no edema  SKIN: pale pink erythema to shaft of glans and small fissures noted when retracting the foreskin

## 2024-01-19 NOTE — PATIENT INSTRUCTIONS
Butt paste cream.    Mix equal parts hydrocortisone, desitin and lotrimin in a covered container.    Apply liberally to affected area several times a day.      BP uncontrolled.    Has not taken BP meds in a few days.    Follow up with your PCP to get medications checked.    Try to reduce sodium in your diet.    Will monitor at home  recommend follow up with PCP if remains elevated.

## 2024-05-26 ENCOUNTER — HEALTH MAINTENANCE LETTER (OUTPATIENT)
Age: 29
End: 2024-05-26

## 2025-06-14 ENCOUNTER — HEALTH MAINTENANCE LETTER (OUTPATIENT)
Age: 30
End: 2025-06-14